# Patient Record
Sex: FEMALE | Race: WHITE | HISPANIC OR LATINO | URBAN - METROPOLITAN AREA
[De-identification: names, ages, dates, MRNs, and addresses within clinical notes are randomized per-mention and may not be internally consistent; named-entity substitution may affect disease eponyms.]

---
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
## 2024-02-08 ENCOUNTER — APPOINTMENT (OUTPATIENT)
Dept: LAB | Facility: MEDICAL CENTER | Age: 19
End: 2024-02-08
Payer: OTHER MISCELLANEOUS

## 2024-03-26 ENCOUNTER — APPOINTMENT (OUTPATIENT)
Dept: MEDICAL GROUP | Facility: PHYSICIAN GROUP | Age: 19
End: 2024-03-26
Payer: OTHER MISCELLANEOUS

## 2024-04-15 ENCOUNTER — APPOINTMENT (OUTPATIENT)
Dept: RADIOLOGY | Facility: MEDICAL CENTER | Age: 19
End: 2024-04-15
Attending: EMERGENCY MEDICINE
Payer: COMMERCIAL

## 2024-04-15 ENCOUNTER — HOSPITAL ENCOUNTER (EMERGENCY)
Facility: MEDICAL CENTER | Age: 19
End: 2024-04-16
Attending: EMERGENCY MEDICINE
Payer: COMMERCIAL

## 2024-04-15 VITALS
TEMPERATURE: 97.2 F | RESPIRATION RATE: 17 BRPM | BODY MASS INDEX: 22.22 KG/M2 | HEIGHT: 69 IN | SYSTOLIC BLOOD PRESSURE: 99 MMHG | DIASTOLIC BLOOD PRESSURE: 75 MMHG | OXYGEN SATURATION: 96 % | WEIGHT: 150 LBS | HEART RATE: 65 BPM

## 2024-04-15 DIAGNOSIS — M54.50 ACUTE BILATERAL LOW BACK PAIN WITHOUT SCIATICA: ICD-10-CM
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
## 2024-04-15 DIAGNOSIS — M54.6 ACUTE BILATERAL THORACIC BACK PAIN: ICD-10-CM

## 2024-04-15 PROBLEM — T14.90XA TRAUMA: Status: ACTIVE | Noted: 2024-04-15

## 2024-04-15 LAB
ABO GROUP BLD: NORMAL
ALBUMIN SERPL BCP-MCNC: 4 G/DL (ref 3.2–4.9)
ALBUMIN/GLOB SERPL: 1.3 G/DL
ALP SERPL-CCNC: 82 U/L (ref 30–99)
ALT SERPL-CCNC: 10 U/L (ref 2–50)
ANION GAP SERPL CALC-SCNC: 13 MMOL/L (ref 7–16)
APTT PPP: 28.5 SEC (ref 24.7–36)
AST SERPL-CCNC: 16 U/L (ref 12–45)
BILIRUB SERPL-MCNC: <0.2 MG/DL (ref 0.1–1.5)
BLD GP AB SCN SERPL QL: NORMAL
BUN SERPL-MCNC: 9 MG/DL (ref 8–22)
CALCIUM ALBUM COR SERPL-MCNC: 9.2 MG/DL (ref 8.5–10.5)
CALCIUM SERPL-MCNC: 9.2 MG/DL (ref 8.5–10.5)
CHLORIDE SERPL-SCNC: 104 MMOL/L (ref 96–112)
CO2 SERPL-SCNC: 21 MMOL/L (ref 20–33)
CREAT SERPL-MCNC: 0.51 MG/DL (ref 0.5–1.4)
ERYTHROCYTE [DISTWIDTH] IN BLOOD BY AUTOMATED COUNT: 48.7 FL (ref 35.9–50)
ETHANOL BLD-MCNC: <10.1 MG/DL
GFR SERPLBLD CREATININE-BSD FMLA CKD-EPI: 137 ML/MIN/1.73 M 2
GLOBULIN SER CALC-MCNC: 3.1 G/DL (ref 1.9–3.5)
GLUCOSE SERPL-MCNC: 83 MG/DL (ref 65–99)
HCG SERPL QL: NEGATIVE
HCT VFR BLD AUTO: 35.6 % (ref 37–47)
HGB BLD-MCNC: 10.8 G/DL (ref 12–16)
INR PPP: 0.96 (ref 0.87–1.13)
MCH RBC QN AUTO: 25.1 PG (ref 27–33)
MCHC RBC AUTO-ENTMCNC: 30.3 G/DL (ref 32.2–35.5)
MCV RBC AUTO: 82.8 FL (ref 81.4–97.8)
PLATELET # BLD AUTO: 243 K/UL (ref 164–446)
PMV BLD AUTO: 10.1 FL (ref 9–12.9)
POTASSIUM SERPL-SCNC: 3.9 MMOL/L (ref 3.6–5.5)
PROT SERPL-MCNC: 7.1 G/DL (ref 6–8.2)
PROTHROMBIN TIME: 12.9 SEC (ref 12–14.6)
RBC # BLD AUTO: 4.3 M/UL (ref 4.2–5.4)
RH BLD: NORMAL
SODIUM SERPL-SCNC: 138 MMOL/L (ref 135–145)
WBC # BLD AUTO: 5 K/UL (ref 4.8–10.8)

## 2024-04-15 PROCEDURE — 700111 HCHG RX REV CODE 636 W/ 250 OVERRIDE (IP): Performed by: SURGERY

## 2024-04-15 PROCEDURE — 86901 BLOOD TYPING SEROLOGIC RH(D): CPT

## 2024-04-15 PROCEDURE — 86900 BLOOD TYPING SEROLOGIC ABO: CPT

## 2024-04-15 PROCEDURE — 36415 COLL VENOUS BLD VENIPUNCTURE: CPT

## 2024-04-15 PROCEDURE — 96375 TX/PRO/DX INJ NEW DRUG ADDON: CPT

## 2024-04-15 PROCEDURE — 85730 THROMBOPLASTIN TIME PARTIAL: CPT

## 2024-04-15 PROCEDURE — 72128 CT CHEST SPINE W/O DYE: CPT

## 2024-04-15 PROCEDURE — 84703 CHORIONIC GONADOTROPIN ASSAY: CPT

## 2024-04-15 PROCEDURE — 700117 HCHG RX CONTRAST REV CODE 255

## 2024-04-15 PROCEDURE — 96365 THER/PROPH/DIAG IV INF INIT: CPT

## 2024-04-15 PROCEDURE — 700102 HCHG RX REV CODE 250 W/ 637 OVERRIDE(OP): Performed by: EMERGENCY MEDICINE

## 2024-04-15 PROCEDURE — 307742 HCHG YELLOW TRAUMA TEAM SERVICES

## 2024-04-15 PROCEDURE — A9270 NON-COVERED ITEM OR SERVICE: HCPCS | Performed by: EMERGENCY MEDICINE

## 2024-04-15 PROCEDURE — 99283 EMERGENCY DEPT VISIT LOW MDM: CPT | Performed by: SURGERY

## 2024-04-15 PROCEDURE — 82077 ASSAY SPEC XCP UR&BREATH IA: CPT

## 2024-04-15 PROCEDURE — 71260 CT THORAX DX C+: CPT

## 2024-04-15 PROCEDURE — 99285 EMERGENCY DEPT VISIT HI MDM: CPT

## 2024-04-15 PROCEDURE — 80053 COMPREHEN METABOLIC PANEL: CPT

## 2024-04-15 PROCEDURE — 72125 CT NECK SPINE W/O DYE: CPT

## 2024-04-15 PROCEDURE — 86850 RBC ANTIBODY SCREEN: CPT

## 2024-04-15 PROCEDURE — 700105 HCHG RX REV CODE 258: Performed by: SURGERY

## 2024-04-15 PROCEDURE — 85610 PROTHROMBIN TIME: CPT

## 2024-04-15 PROCEDURE — 72131 CT LUMBAR SPINE W/O DYE: CPT

## 2024-04-15 PROCEDURE — 85027 COMPLETE CBC AUTOMATED: CPT

## 2024-04-15 PROCEDURE — 700111 HCHG RX REV CODE 636 W/ 250 OVERRIDE (IP): Performed by: EMERGENCY MEDICINE

## 2024-04-15 RX ORDER — NAPROXEN 500 MG/1
500 TABLET ORAL 2 TIMES DAILY WITH MEALS
Qty: 15 TABLET | Refills: 0 | Status: SHIPPED | OUTPATIENT
Start: 2024-04-15

## 2024-04-15 RX ORDER — ONDANSETRON 2 MG/ML
INJECTION INTRAMUSCULAR; INTRAVENOUS
Status: COMPLETED | OUTPATIENT
Start: 2024-04-15 | End: 2024-04-15

## 2024-04-15 RX ORDER — MORPHINE SULFATE 4 MG/ML
INJECTION INTRAVENOUS
Status: COMPLETED | OUTPATIENT
Start: 2024-04-15 | End: 2024-04-15

## 2024-04-15 RX ORDER — CYCLOBENZAPRINE HCL 10 MG
10 TABLET ORAL 3 TIMES DAILY PRN
Qty: 30 TABLET | Refills: 0 | Status: SHIPPED | OUTPATIENT
Start: 2024-04-15

## 2024-04-15 RX ORDER — HYDROCODONE BITARTRATE AND ACETAMINOPHEN 5; 325 MG/1; MG/1
1 TABLET ORAL ONCE
Status: COMPLETED | OUTPATIENT
Start: 2024-04-15 | End: 2024-04-15

## 2024-04-15 RX ADMIN — MORPHINE SULFATE 4 MG: 4 INJECTION, SOLUTION INTRAMUSCULAR; INTRAVENOUS at 20:43

## 2024-04-15 RX ADMIN — IOHEXOL 100 ML: 350 INJECTION, SOLUTION INTRAVENOUS at 21:04

## 2024-04-15 RX ADMIN — ONDANSETRON 4 MG: 2 INJECTION INTRAMUSCULAR; INTRAVENOUS at 20:43

## 2024-04-15 RX ADMIN — HYDROCODONE BITARTRATE AND ACETAMINOPHEN 1 TABLET: 5; 325 TABLET ORAL at 23:26

## 2024-04-15 RX ADMIN — METHOCARBAMOL 1000 MG: 100 INJECTION, SOLUTION INTRAMUSCULAR; INTRAVENOUS at 22:08

## 2024-04-15 ASSESSMENT — PAIN DESCRIPTION - PAIN TYPE
TYPE: ACUTE PAIN
TYPE: ACUTE PAIN

## 2024-04-16 NOTE — H&P
"    CHIEF COMPLAINT: Back pain.     HISTORY OF PRESENT ILLNESS: The patient is a 18 year-old White  young person  who was injured in a fall. The patient experienced a 15 to 20 foot fall while climbing. They were bouldering at [facility] when she lost her footing and fell while at the top of the bouldering area. Per report they landed on their feet but they do not recall how they impacted the ground only that their back hurt after. They had no loss of consciousness. The patient denies any chronic anticoagulation or antiplatelet medications. They complain of pain in their thoracic and lumbar back.    TRIAGE CATEGORY: The patient was triaged as a Trauma Yellow Activation. An expeditious primary and secondary survey with required adjuncts was conducted. See Trauma Narrator for full details.    PAST MEDICAL HISTORY:  has a past medical history of Anemia.    PAST SURGICAL HISTORY: denies past surgical history.    ALLERGIES: No Known Allergies    CURRENT MEDICATIONS:   Home Medications       Reviewed by Mahnaz Kolb R.N. (Registered Nurse) on 04/15/24 at 2048  Med List Status: Not Addressed     Medication Last Dose Status   escitalopram (LEXAPRO) 10 MG Tab  Active   norgestimate-ethinyl estradiol (ESTARYLLA) 0.25-35 MG-MCG per tablet  Active                  FAMILY HISTORY: family history includes No Known Problems in Shauna Bundy's mother.    SOCIAL HISTORY:  reports that Shauna Bundy has been smoking cigarettes. Shauna Bundy has never used smokeless tobacco. Shauna Bundy reports current alcohol use. Shauna Bundy reports that Shauna Bundy does not currently use drugs.    REVIEW OF SYSTEMS: Comprehensive review of systems is negative with the exception of the aforementioned HPI, PMH, and PSH bullets in accordance with CMS guidelines.    PHYSICAL EXAMINATION:      Vital Signs: /81   Pulse 63   Temp 36 °C (96.8 °F)   Resp (!) 22   Ht 1.753 m (5' 9\")   Wt 68 kg (150 lb)   SpO2 98% "   Physical Exam  Vitals reviewed.   Constitutional:       General: Shauna Bundy is not in acute distress.     Appearance: Shauna Bundy is not ill-appearing or toxic-appearing.   HENT:      Head: Normocephalic and atraumatic.      Right Ear: External ear normal.      Left Ear: External ear normal.      Nose: Nose normal.      Comments: Nasal piercing.     Mouth/Throat:      Mouth: Mucous membranes are moist.      Pharynx: Oropharynx is clear.   Eyes:      General: No scleral icterus.     Pupils: Pupils are equal, round, and reactive to light.   Cardiovascular:      Rate and Rhythm: Normal rate and regular rhythm.      Pulses: Normal pulses.   Pulmonary:      Effort: Pulmonary effort is normal. No respiratory distress.      Breath sounds: Normal breath sounds.   Chest:      Chest wall: No deformity, tenderness or crepitus.   Abdominal:      General: There is no distension.      Palpations: Abdomen is soft.      Tenderness: There is no abdominal tenderness. There is no guarding or rebound.   Genitourinary:     Comments: Pelvis stable.  Musculoskeletal:         General: No deformity. Normal range of motion.      Cervical back: Normal range of motion and neck supple. No deformity or tenderness.      Thoracic back: Tenderness present. No deformity.      Lumbar back: Tenderness present. No deformity.      Comments: Lower thoracic and upper lumbar tenderness.   Skin:     General: Skin is warm and dry.      Capillary Refill: Capillary refill takes less than 2 seconds.      Coloration: Skin is not jaundiced.   Neurological:      General: No focal deficit present.      Mental Status: Shauna uBndy is alert and oriented to person, place, and time.   Psychiatric:         Behavior: Behavior is cooperative.         LABORATORY VALUES:   Recent Labs     04/15/24  2039   WBC 5.0   RBC 4.30   HEMOGLOBIN 10.8*   HEMATOCRIT 35.6*   MCV 82.8   MCH 25.1*   MCHC 30.3*   RDW 48.7   PLATELETCT 243   MPV 10.1     Recent Labs      04/15/24  2039   SODIUM 138   POTASSIUM 3.9   CHLORIDE 104   CO2 21   GLUCOSE 83   BUN 9   CREATININE 0.51   CALCIUM 9.2     Recent Labs     04/15/24  2039   ASTSGOT 16   ALTSGPT 10   TBILIRUBIN <0.2   ALKPHOSPHAT 82   GLOBULIN 3.1   INR 0.96     Recent Labs     04/15/24  2039   APTT 28.5   INR 0.96        IMAGING:   CT-LSPINE W/O PLUS RECONS   Final Result         1.  No acute traumatic bony injury of the lumbar spine.      CT-TSPINE W/O PLUS RECONS   Final Result         1.  No acute traumatic bony injury of the thoracic spine.      CT-CSPINE WITHOUT PLUS RECONS   Final Result         1.  No acute traumatic bony injury of the cervical spine is apparent.   2.  Hazy opacity in the right apex suggesting subtle infiltrate, consider contusion.      CT-CHEST,ABDOMEN,PELVIS WITH   Final Result         1.  Minimal hazy opacity in the lingula, could represent subtle contusion versus atelectasis.   2.  Hepatomegaly          ASSESSMENT AND PLAN:     18 year-old who suffered a mechanical fall from 15 to 20 feet. No loss of consciousness with complaints of back pain. Primary survey without concerning findings. Mental status appropriate and neurologically intact on exam, tenderness in the lower thoracic and upper lumber back, no obvious deformities. Imaging without evidence of significant injury.  Plan for pain control, muscle relaxants may be helpful given mechanism and back pain. If able to ambulate can be discharged home with return precautions.       DISPOSITION: Discharge to home.  Call or return to the Emergency Department for recurrent or worsening symptoms.         ____________________________________     Prince Castro M.D.    DD: 4/15/2024  9:02 PM

## 2024-04-16 NOTE — ASSESSMENT & PLAN NOTE
Rock climbing fall, approximately 15ft.   Trauma Yellow Activation.  Prince Castro MD. Trauma Surgery.

## 2024-04-16 NOTE — DISCHARGE PLANNING
Trauma Yellow from the Lobby    Pt arrived via Private Vehicle after a fall rock climbing. Pt is Shauna Bundy 2005. Pt stated her boyfriend Bijan is in the lobby and her mother Lauren is en route .     SW will monitor for mother to arrive and bring to Pt. Room  SW will remain available for support as needed.

## 2024-04-16 NOTE — ED NOTES
Bedside report received from off going RN Michela, assumed care of patient.  POC discussed with patient. Call light within reach, all needs addressed at this time.       Fall risk interventions in place: Place fall risk sign on patient's door, Keep floor surfaces clean and dry, and Accompanied to restroom (all applicable per [facility] Fall risk assessment)   Continuous monitoring: Cardiac Leads, Pulse Ox, or Blood Pressure  IVF/IV medications: Infusion per MAR (List Med(s)) robaxin  Oxygen: Room Air  Bedside sitter: Not Applicable   Isolation: Not Applicable

## 2024-04-16 NOTE — ED NOTES
PIV removed. Patient given discharge instructions and verbalizes understanding. Left with all belongings and escorted to ED lobby in wheelchair and assisted into car by significant other and RN.

## 2024-04-16 NOTE — ED TRIAGE NOTES
Shauna Bundy  18 y.o.  adult  Chief Complaint   Patient presents with    Trauma Yellow     Pt was climbing, unrestrained at climbing gym. Fell 20 feet on their feet. Did not hit head, no helmet. C/o midback pain. Arrived by POV to ED with boyfriend.      Pt by dayan to CT then destined for blue 19.

## 2024-04-16 NOTE — ED PROVIDER NOTES
"ER Provider Note    Scribed for Dr. Quang Cannon M.D. by Bill Liang. 4/15/2024  8:44 PM    Primary Care Provider: JUAN Doty    CHIEF COMPLAINT  Chief Complaint   Patient presents with    Trauma Yellow     Pt was climbing, unrestrained at climbing gym. Fell 20 feet on their feet. Did not hit head, no helmet. C/o midback pain. Arrived by POV to ED with boyfriend.        EXTERNAL RECORDS REVIEWED  Outpatient Notes The patient was seen in office on 9/9/2022. She has a history of recurrent major depressive disorder.    HPI/ROS    Shauna Bundy is a 18 y.o. adult who presents to the ED as a trauma yellow for evaluation of injuries secondary to falling 15 feet while rock climbing onset prior to arrival. The patient landed on her feet before rolling to her back. She reports associated low back pain. The patient denies any head strike or loss of consciousness. She denies any previous surgeries.     PAST MEDICAL HISTORY  Past Medical History:   Diagnosis Date    Anemia        SURGICAL HISTORY  Patient denies past surgeries.    FAMILY HISTORY  Family History   Problem Relation Age of Onset    No Known Problems Mother        SOCIAL HISTORY   reports that Shauna Bundy has been smoking cigarettes. Shauna Bundy has never used smokeless tobacco. Shauna Bundy reports current alcohol use. Shauna Bundy reports that Shauna Bundy does not currently use drugs.    CURRENT MEDICATIONS  Previous Medications    ESCITALOPRAM (LEXAPRO) 10 MG TAB    Take 1 Tablet by mouth every day.    NORGESTIMATE-ETHINYL ESTRADIOL (ESTARYLLA) 0.25-35 MG-MCG PER TABLET    Take 1 Tablet by mouth every day.       ALLERGIES  Patient has no known allergies.    PHYSICAL EXAM  /55   Pulse 71   Temp 36 °C (96.8 °F)   Resp 18   Ht 1.753 m (5' 9\")   Wt 68 kg (150 lb)   SpO2 99% Comment: RA  BMI 22.15 kg/m²   Constitutional: Alert in no apparent distress.  HENT: No signs of trauma, Bilateral external ears " normal, Nose normal.   Eyes: Pupils are equal and reactive, Conjunctiva normal, Non-icteric.   Neck: Normal range of motion, No tenderness, Supple,   Cardiovascular: Regular rate and rhythm, no murmurs.   Thorax & Lungs: Normal breath sounds, No respiratory distress, No wheezing, No chest tenderness.   Abdomen: Bowel sounds normal, Soft, No tenderness,   Skin: Warm, Dry, No erythema, No rash.   Back: Lower thoracic and upper lumbar tenderness to palpation, no step-off , No CVA tenderness.   Extremities: No edema, No tenderness, No cyanosis, no tenderness, strong radial pulses  Neurologic: Moving all extremities, Sensation intact  Psychiatric: Affect normal     DIAGNOSTIC STUDIES & PROCEDURES    Labs:   Labs Reviewed   CBC WITHOUT DIFFERENTIAL - Abnormal; Notable for the following components:       Result Value    Hemoglobin 10.8 (*)     Hematocrit 35.6 (*)     MCH 25.1 (*)     MCHC 30.3 (*)     All other components within normal limits   DIAGNOSTIC ALCOHOL   COMP METABOLIC PANEL   PROTHROMBIN TIME   APTT   HCG QUAL SERUM   COD (ADULT)   ESTIMATED GFR   COMPONENT CELLULAR      All labs reviewed by me.    Radiology:   The attending Emergency Physician has independently interpreted the diagnostic imaging associated with this visit and is awaiting the final reading from the radiologist, which will be displayed below.    Preliminary interpretation is a follows: No spinal fractures noted.  Radiologist interpretation:     CT-LSPINE W/O PLUS RECONS   Final Result         1.  No acute traumatic bony injury of the lumbar spine.      CT-TSPINE W/O PLUS RECONS   Final Result         1.  No acute traumatic bony injury of the thoracic spine.      CT-CSPINE WITHOUT PLUS RECONS   Final Result         1.  No acute traumatic bony injury of the cervical spine is apparent.   2.  Hazy opacity in the right apex suggesting subtle infiltrate, consider contusion.      CT-CHEST,ABDOMEN,PELVIS WITH   Final Result         1.  Minimal hazy  opacity in the lingula, could represent subtle contusion versus atelectasis.   2.  Hepatomegaly           COURSE & MEDICAL DECISION MAKING    ED Observation Status? No; Patient does not meet criteria for ED Observation.     INITIAL ASSESSMENT AND PLAN  Care Narrative:       8:44 PM - Patient seen and evaluated in the trauma bay. The patient is a 18 year old who presents to the ED as a trauma yellow for evaluation of injuries secondary to falling 15 feet while rock climbing. There is associated upper lumbar and lower thoracic pain. No head strike or loss of consciousness. Discussed plan of care, including getting labs and imaging to monitor his symptoms. Patient agrees to plan of care. Patient will be treated with Zofran 4 mg IV and morphine 4 mg IV for Shauna Bundy's symptoms. Ordered CT-Chest abdomen pelvis w/, CT-Cspine w/o plus recons, CT-Tspine w/o plus recons, CT-Lspine w/o plus recons, diagnostic alcohol, CBC w/o diff, CMP, prothrombin time, APTT, HCG qual serum, and component cellular to evaluate.    9:22 PM - The patient will be treated with Robaxin 1 g IV.    11:23 PM - The patient is complaining of pain, so she will be treated with Norco 5-325 mg PO.     12:22 AM - I reevaluated the patient at bedside. The patient informs me they feel improved following Zofran, morphine, and Norco administration. I discussed the patient's diagnostic study results. I discussed plan for discharge and follow up as outlined below. The patient is stable for discharge at this time and will return for any new or worsening symptoms. Patient verbalizes understanding and support with my plan for discharge.      ADDITIONAL PROBLEM LIST AND DISPOSITION   Patient with a fall from over 15 feet.  Ultimately she had back pain all throughout her thoracic and lumbar spine.  CTs were performed of her entire spine which were negative.  Given his distracting injuries CTs of her chest, abdomen pelvis were performed which is negative.  She  is mildly anemic.  Negative for alcohol.  No coagulopathy.  No LFT abnormalities.  Patient improved after medication.  Will discharge home with strict return precautions and follow-up.               DISPOSITION AND DISCUSSIONS    Discussion of management with other [facility] or appropriate source(s): None     Escalation of care considered, and ultimately not performed: acute inpatient care management, however at this time, the patient is most appropriate for outpatient management.    Barriers to care at this time, including but not limited to:  None .     Decision tools and prescription drugs considered including, but not limited to: Pain Medications prescribed for muscle relaxation and pain .    The patient will return for new or worsening symptoms and is stable at the time of discharge.    The patient is referred to a primary physician for blood pressure management, diabetic screening, and for all other preventative health concerns.    DISPOSITION:  Patient will be discharged home in stable condition.    FOLLOW UP:  JUAN Doty  [address] [facility] [id number]  [phone]    In 2 days        OUTPATIENT MEDICATIONS:  Discharge Medication List as of 4/15/2024 11:56 PM        START taking these medications    Details   naproxen (NAPROSYN) 500 MG Tab Take 1 Tablet by mouth 2 times a day with meals., Disp-15 Tablet, R-0, Normal      cyclobenzaprine (FLEXERIL) 10 mg Tab Take 1 Tablet by mouth 3 times a day as needed for Mild Pain or Moderate Pain., Disp-30 Tablet, R-0, Normal              FINAL IMPRESSION   1. Fall, initial encounter    2. Acute bilateral low back pain without sciatica    3. Acute bilateral thoracic back pain       Bill MAR), am scribing for, and in the presence of, Quang Cannon M.D..    Electronically signed by: Bill Gagnon), 4/15/2024    Quang MAR M.D. personally performed the services described in this documentation, as scribed by  Bill Liang in my presence, and it is both accurate and complete.    The note accurately reflects work and decisions made by me.  Quang Cannon M.D.  4/16/2024  3:33 AM

## 2024-04-16 NOTE — ED NOTES
Brought in by boyfriend. Pt fell approx.15 ft rockclimbing on to back. C/o tenderness to the thoracic and lumbar region. VSS, sensation and movement intact.

## 2024-04-22 DIAGNOSIS — N92.6 IRREGULAR PERIODS: ICD-10-CM

## 2024-04-22 DIAGNOSIS — Z30.09 ENCOUNTER FOR OTHER GENERAL COUNSELING OR ADVICE ON CONTRACEPTION: ICD-10-CM

## 2024-04-23 NOTE — TELEPHONE ENCOUNTER
Received request via: Pharmacy    Was the patient seen in the last year in this department? Yes    Does the patient have an active prescription (recently filled or refills available) for medication(s) requested? No    Ph[facility]    Does the patient have USP Plus and need 100 day supply (blood pressure, diabetes and cholesterol meds only)? Patient does not have SCP

## 2024-04-24 RX ORDER — NORGESTIMATE AND ETHINYL ESTRADIOL 0.25-0.035
1 KIT ORAL
Qty: 56 TABLET | Refills: 2 | Status: SHIPPED | OUTPATIENT
Start: 2024-04-24

## 2024-05-15 ENCOUNTER — APPOINTMENT (OUTPATIENT)
Dept: MEDICAL GROUP | Facility: PHYSICIAN GROUP | Age: 19
End: 2024-05-15
Payer: COMMERCIAL

## 2024-05-15 VITALS — HEIGHT: 69 IN | WEIGHT: 160 LBS | BODY MASS INDEX: 23.7 KG/M2

## 2024-05-15 DIAGNOSIS — N92.6 IRREGULAR PERIODS: ICD-10-CM

## 2024-05-15 DIAGNOSIS — F33.1 MODERATE EPISODE OF RECURRENT MAJOR DEPRESSIVE DISORDER (HCC): ICD-10-CM

## 2024-05-15 PROCEDURE — 99214 OFFICE O/P EST MOD 30 MIN: CPT

## 2024-05-15 RX ORDER — NORGESTIMATE AND ETHINYL ESTRADIOL 0.25-0.035
1 KIT ORAL DAILY
Qty: 84 TABLET | Refills: 3 | Status: SHIPPED | OUTPATIENT
Start: 2024-05-15 | End: 2024-08-07

## 2024-05-15 RX ORDER — SERTRALINE HYDROCHLORIDE 100 MG/1
100 TABLET, FILM COATED ORAL DAILY
Qty: 90 TABLET | Refills: 3 | Status: SHIPPED | OUTPATIENT
Start: 2024-05-15

## 2024-05-15 RX ORDER — SERTRALINE HYDROCHLORIDE 100 MG/1
100 TABLET, FILM COATED ORAL DAILY
COMMUNITY
End: 2024-05-15

## 2024-05-15 ASSESSMENT — PATIENT HEALTH QUESTIONNAIRE - PHQ9
SUM OF ALL RESPONSES TO PHQ9 QUESTIONS 1 AND 2: 2
4. FEELING TIRED OR HAVING LITTLE ENERGY: MORE THAN HALF THE DAYS
7. TROUBLE CONCENTRATING ON THINGS, SUCH AS READING THE NEWSPAPER OR WATCHING TELEVISION: NEARLY EVERY DAY
1. LITTLE INTEREST OR PLEASURE IN DOING THINGS: SEVERAL DAYS
8. MOVING OR SPEAKING SO SLOWLY THAT OTHER PEOPLE COULD HAVE NOTICED. OR THE OPPOSITE, BEING SO FIGETY OR RESTLESS THAT YOU HAVE BEEN MOVING AROUND A LOT MORE THAN USUAL: MORE THAN HALF THE DAYS
2. FEELING DOWN, DEPRESSED, IRRITABLE, OR HOPELESS: SEVERAL DAYS
3. TROUBLE FALLING OR STAYING ASLEEP OR SLEEPING TOO MUCH: MORE THAN HALF THE DAYS
5. POOR APPETITE OR OVEREATING: MORE THAN HALF THE DAYS
SUM OF ALL RESPONSES TO PHQ QUESTIONS 1-9: 14
9. THOUGHTS THAT YOU WOULD BE BETTER OFF DEAD, OR OF HURTING YOURSELF: NOT AT ALL
6. FEELING BAD ABOUT YOURSELF - OR THAT YOU ARE A FAILURE OR HAVE LET YOURSELF OR YOUR FAMILY DOWN: SEVERAL DAYS

## 2024-05-15 ASSESSMENT — FIBROSIS 4 INDEX: FIB4 SCORE: 0.4

## 2024-05-15 NOTE — PROGRESS NOTES
Virtual Visit: Established Patient   This visit was conducted via Zoom using secure and encrypted videoconferencing technology.   The patient was in their home in the [facility].    The patient's identity was confirmed and verbal consent was obtained for this virtual visit.    Subjective:   CC:   Chief Complaint   Patient presents with    Medication Follow-up     Pt was in mental health program and meds were switched      Shauna Bundy is a 18 y.o. adult presenting for evaluation and management of:    Moderate episode of recurrent major depressive disorder (HCC)  Chronic, stable.   Patient reports that she was recently seen by a psychiatrist and her Lexapro was changed over to Zoloft for which she is taking 150 mg daily. She feels like she likes the zoloft that she is taking and feels like it is working well for her. She has been on this dosing for about 9 months.     She reports that her mood fluctuates a lot, but overall she does not feel as poorly as she used to feel. She feels that she is more productive and happier and focusing on her relationships more. She does not do any more work with the mental health program, she graduated. She does have resources there if needed.     Depression Screening    Little interest or pleasure in doing things?   Several days  Feeling down, depressed , or hopeless?  Several days  Trouble falling or staying asleep, or sleeping too much?   More than half the days  Feeling tired or having little energy?   More than half the days  Poor appetite or overeating?   More than half the days  Feeling bad about yourself - or that you are a failure or have let yourself or your family down?  Several days  Trouble concentrating on things, such as reading the newspaper or watching television?  Nearly every day  Moving or speaking so slowly that other people could have noticed.  Or the opposite - being so fidgety or restless that you have been moving around a lot more than usual?   More  "than half the days  Thoughts that you would be better off dead, or of hurting yourself?   Not at all  Patient Health Questionnaire Score:  (!) 14      If depressive symptoms identified deferred to follow up visit unless specifically addressed in assesment and plan.    Interpretation of PHQ-9 Total Score   Score Severity   1-4 No Depression   5-9 Mild Depression   10-14 Moderate Depression   15-19 Moderately Severe Depression   20-27 Severe Depression    Plan: patient wishes to continue sertraline 150mg daily, but wishes to establish with psychiatry should she need an adjustment in the future.       Irregular periods  This is a chronic, stable medical condition.   Currently taking estarylla 0.25-35mg-mcg daily.  Wishes to have a refill today         ROS   Otherwise negative     Current medicines (including changes today)  Current Outpatient Medications   Medication Sig Dispense Refill    sertraline (ZOLOFT) 100 MG Tab Take 1 Tablet by mouth every day. 90 Tablet 3    sertraline (ZOLOFT) 50 MG Tab Take 1 Tablet by mouth every day. 90 Tablet 3    ESTARYLLA 0.25-35 MG-MCG per tablet Take 1 Tablet by mouth every day for 84 days. 84 Tablet 3    ESTARYLLA 0.25-35 MG-MCG per tablet TAKE 1 TABLET BY MOUTH EVERY DAY 56 Tablet 2    naproxen (NAPROSYN) 500 MG Tab Take 1 Tablet by mouth 2 times a day with meals. (Patient not taking: Reported on 5/15/2024) 15 Tablet 0    cyclobenzaprine (FLEXERIL) 10 mg Tab Take 1 Tablet by mouth 3 times a day as needed for Mild Pain or Moderate Pain. (Patient not taking: Reported on 5/15/2024) 30 Tablet 0     No current facility-administered medications for this visit.       Patient Active Problem List    Diagnosis Date Noted    Irregular periods 05/15/2024    Trauma 04/15/2024    Social anxiety disorder 05/22/2018    Moderate episode of recurrent major depressive disorder (HCC) 02/06/2018        Objective:   Ht 1.753 m (5' 9\")   Wt 72.6 kg (160 lb)   LMP 04/28/2024 (Approximate)   BMI 23.63 " kg/m²     Physical Exam:  Constitutional: Alert, no distress, well-groomed.  Skin: No rashes in visible areas.  Eye: Round. Conjunctiva clear, lids normal. No icterus.   ENMT: Lips pink without lesions, good dentition, moist mucous membranes. Phonation normal.  Neck: No masses, no thyromegaly. Moves freely without pain.  Respiratory: Unlabored respiratory effort, no cough or audible wheeze  Psych: Alert and oriented x3, normal affect and mood.     Assessment and Plan:   The following treatment plan was discussed:     1. Moderate episode of recurrent major depressive disorder (HCC)  - Referral to Behavioral Health  - sertraline (ZOLOFT) 100 MG Tab; Take 1 Tablet by mouth every day.  Dispense: 90 Tablet; Refill: 3  - sertraline (ZOLOFT) 50 MG Tab; Take 1 Tablet by mouth every day.  Dispense: 90 Tablet; Refill: 3    2. Irregular periods  - ESTARYLLA 0.25-35 MG-MCG per tablet; Take 1 Tablet by mouth every day for 84 days.  Dispense: 84 Tablet; Refill: 3      Follow-up: Return in about 1 year (around 5/15/2025) for AWV.

## 2024-05-15 NOTE — ASSESSMENT & PLAN NOTE
This is a chronic, stable medical condition.   Currently taking estarylla 0.25-35mg-mcg daily.  Wishes to have a refill today

## 2024-05-15 NOTE — ASSESSMENT & PLAN NOTE
Chronic, stable.   Patient reports that she was recently seen by a psychiatrist and her Lexapro was changed over to Zoloft for which she is taking 150 mg daily. She feels like she likes the zoloft that she is taking and feels like it is working well for her. She has been on this dosing for about 9 months.     She reports that her mood fluctuates a lot, but overall she does not feel as poorly as she used to feel. She feels that she is more productive and happier and focusing on her relationships more. She does not do any more work with the mental health program, she graduated. She does have resources there if needed.     Depression Screening    Little interest or pleasure in doing things?   Several days  Feeling down, depressed , or hopeless?  Several days  Trouble falling or staying asleep, or sleeping too much?   More than half the days  Feeling tired or having little energy?   More than half the days  Poor appetite or overeating?   More than half the days  Feeling bad about yourself - or that you are a failure or have let yourself or your family down?  Several days  Trouble concentrating on things, such as reading the newspaper or watching television?  Nearly every day  Moving or speaking so slowly that other people could have noticed.  Or the opposite - being so fidgety or restless that you have been moving around a lot more than usual?   More than half the days  Thoughts that you would be better off dead, or of hurting yourself?   Not at all  Patient Health Questionnaire Score:  (!) 14      If depressive symptoms identified deferred to follow up visit unless specifically addressed in assesment and plan.    Interpretation of PHQ-9 Total Score   Score Severity   1-4 No Depression   5-9 Mild Depression   10-14 Moderate Depression   15-19 Moderately Severe Depression   20-27 Severe Depression    Plan: patient wishes to continue sertraline 150mg daily, but wishes to establish with psychiatry should she need an  adjustment in the future.

## 2024-06-29 DIAGNOSIS — N92.6 IRREGULAR PERIODS: ICD-10-CM

## 2024-06-29 DIAGNOSIS — F33.1 MODERATE EPISODE OF RECURRENT MAJOR DEPRESSIVE DISORDER (HCC): ICD-10-CM

## 2024-07-09 RX ORDER — NORGESTIMATE AND ETHINYL ESTRADIOL 0.25-0.035
1 KIT ORAL DAILY
Qty: 84 TABLET | Refills: 3 | Status: SHIPPED | OUTPATIENT
Start: 2024-07-09 | End: 2024-10-01

## 2024-07-09 RX ORDER — SERTRALINE HYDROCHLORIDE 100 MG/1
100 TABLET, FILM COATED ORAL DAILY
Qty: 90 TABLET | Refills: 3 | Status: SHIPPED | OUTPATIENT
Start: 2024-07-09

## 2024-10-22 ENCOUNTER — APPOINTMENT (OUTPATIENT)
Dept: MEDICAL GROUP | Facility: PHYSICIAN GROUP | Age: 19
End: 2024-10-22
Payer: COMMERCIAL

## 2024-11-16 DIAGNOSIS — F33.1 MODERATE EPISODE OF RECURRENT MAJOR DEPRESSIVE DISORDER (HCC): ICD-10-CM

## 2024-11-18 NOTE — TELEPHONE ENCOUNTER
Received request via: Pharmacy    Was the patient seen in the last year in this department? Yes    Does the patient have an active prescription (recently filled or refills available) for medication(s) requested? No    [facility]    Does the patient have alf Plus and need 100-day supply? (This applies to ALL medications) Patient does not have SCP

## 2024-11-21 RX ORDER — SERTRALINE HYDROCHLORIDE 100 MG/1
100 TABLET, FILM COATED ORAL DAILY
Qty: 90 TABLET | Refills: 3 | Status: SHIPPED | OUTPATIENT
Start: 2024-11-21

## 2025-08-01 DIAGNOSIS — F33.1 MODERATE EPISODE OF RECURRENT MAJOR DEPRESSIVE DISORDER (HCC): ICD-10-CM

## 2025-08-01 DIAGNOSIS — N92.6 IRREGULAR PERIODS: ICD-10-CM

## 2025-08-01 DIAGNOSIS — Z30.09 ENCOUNTER FOR OTHER GENERAL COUNSELING OR ADVICE ON CONTRACEPTION: ICD-10-CM

## 2025-08-04 RX ORDER — SERTRALINE HYDROCHLORIDE 100 MG/1
100 TABLET, FILM COATED ORAL DAILY
Qty: 90 TABLET | Refills: 0 | Status: SHIPPED | OUTPATIENT
Start: 2025-08-04

## 2025-08-04 RX ORDER — NORGESTIMATE AND ETHINYL ESTRADIOL 0.25-0.035
1 KIT ORAL
Qty: 84 TABLET | Refills: 0 | Status: SHIPPED | OUTPATIENT
Start: 2025-08-04